# Patient Record
Sex: FEMALE | Race: WHITE | Employment: UNEMPLOYED | URBAN - METROPOLITAN AREA
[De-identification: names, ages, dates, MRNs, and addresses within clinical notes are randomized per-mention and may not be internally consistent; named-entity substitution may affect disease eponyms.]

---

## 2017-02-05 ENCOUNTER — APPOINTMENT (OUTPATIENT)
Dept: CT IMAGING | Age: 67
End: 2017-02-05
Attending: EMERGENCY MEDICINE
Payer: MEDICARE

## 2017-02-05 ENCOUNTER — HOSPITAL ENCOUNTER (EMERGENCY)
Age: 67
Discharge: NURSING, REHAB OR LONG-TERM CARE | End: 2017-02-06
Attending: EMERGENCY MEDICINE
Payer: MEDICARE

## 2017-02-05 ENCOUNTER — APPOINTMENT (OUTPATIENT)
Dept: GENERAL RADIOLOGY | Age: 67
End: 2017-02-05
Attending: EMERGENCY MEDICINE
Payer: MEDICARE

## 2017-02-05 DIAGNOSIS — R56.9 SEIZURE (HCC): Primary | ICD-10-CM

## 2017-02-05 LAB
ALBUMIN SERPL BCP-MCNC: 4.1 G/DL (ref 3.5–5)
ALBUMIN/GLOB SERPL: 1.1 {RATIO} (ref 1.1–2.2)
ALP SERPL-CCNC: 162 U/L (ref 45–117)
ALT SERPL-CCNC: 34 U/L (ref 12–78)
ANION GAP BLD CALC-SCNC: 17 MMOL/L (ref 5–15)
APPEARANCE UR: CLEAR
AST SERPL W P-5'-P-CCNC: 36 U/L (ref 15–37)
BACTERIA URNS QL MICRO: NEGATIVE /HPF
BASOPHILS # BLD AUTO: 0 K/UL (ref 0–0.1)
BASOPHILS # BLD: 0 % (ref 0–1)
BILIRUB SERPL-MCNC: 0.5 MG/DL (ref 0.2–1)
BILIRUB UR QL: NEGATIVE
BUN SERPL-MCNC: 34 MG/DL (ref 6–20)
BUN/CREAT SERPL: 24 (ref 12–20)
CALCIUM SERPL-MCNC: 9.4 MG/DL (ref 8.5–10.1)
CHLORIDE SERPL-SCNC: 102 MMOL/L (ref 97–108)
CK MB CFR SERPL CALC: 1.2 % (ref 0–2.5)
CK MB SERPL-MCNC: 11 NG/ML (ref 5–25)
CK SERPL-CCNC: 908 U/L (ref 26–192)
CO2 SERPL-SCNC: 17 MMOL/L (ref 21–32)
COLOR UR: ABNORMAL
CREAT SERPL-MCNC: 1.41 MG/DL (ref 0.55–1.02)
EOSINOPHIL # BLD: 0 K/UL (ref 0–0.4)
EOSINOPHIL NFR BLD: 0 % (ref 0–7)
EPITH CASTS URNS QL MICRO: ABNORMAL /LPF
ERYTHROCYTE [DISTWIDTH] IN BLOOD BY AUTOMATED COUNT: 13.5 % (ref 11.5–14.5)
GLOBULIN SER CALC-MCNC: 3.7 G/DL (ref 2–4)
GLUCOSE SERPL-MCNC: 482 MG/DL (ref 65–100)
GLUCOSE UR STRIP.AUTO-MCNC: >1000 MG/DL
HCT VFR BLD AUTO: 32.7 % (ref 35–47)
HGB BLD-MCNC: 11.4 G/DL (ref 11.5–16)
HGB UR QL STRIP: ABNORMAL
HYALINE CASTS URNS QL MICRO: ABNORMAL /LPF (ref 0–5)
KETONES UR QL STRIP.AUTO: 15 MG/DL
LACTATE SERPL-SCNC: 4.8 MMOL/L (ref 0.4–2)
LEUKOCYTE ESTERASE UR QL STRIP.AUTO: NEGATIVE
LYMPHOCYTES # BLD AUTO: 8 % (ref 12–49)
LYMPHOCYTES # BLD: 1.2 K/UL (ref 0.8–3.5)
MAGNESIUM SERPL-MCNC: 2.5 MG/DL (ref 1.6–2.4)
MCH RBC QN AUTO: 29.8 PG (ref 26–34)
MCHC RBC AUTO-ENTMCNC: 34.9 G/DL (ref 30–36.5)
MCV RBC AUTO: 85.4 FL (ref 80–99)
MONOCYTES # BLD: 1.2 K/UL (ref 0–1)
MONOCYTES NFR BLD AUTO: 8 % (ref 5–13)
NEUTS SEG # BLD: 13.5 K/UL (ref 1.8–8)
NEUTS SEG NFR BLD AUTO: 84 % (ref 32–75)
NITRITE UR QL STRIP.AUTO: NEGATIVE
PH UR STRIP: 6.5 [PH] (ref 5–8)
PLATELET # BLD AUTO: 407 K/UL (ref 150–400)
POTASSIUM SERPL-SCNC: 4 MMOL/L (ref 3.5–5.1)
PROT SERPL-MCNC: 7.8 G/DL (ref 6.4–8.2)
PROT UR STRIP-MCNC: NEGATIVE MG/DL
RBC # BLD AUTO: 3.83 M/UL (ref 3.8–5.2)
RBC #/AREA URNS HPF: ABNORMAL /HPF (ref 0–5)
SODIUM SERPL-SCNC: 136 MMOL/L (ref 136–145)
SP GR UR REFRACTOMETRY: 1.02 (ref 1–1.03)
TROPONIN I SERPL-MCNC: 0.04 NG/ML
UROBILINOGEN UR QL STRIP.AUTO: 0.2 EU/DL (ref 0.2–1)
WBC # BLD AUTO: 16 K/UL (ref 3.6–11)
WBC URNS QL MICRO: ABNORMAL /HPF (ref 0–4)

## 2017-02-05 PROCEDURE — 70450 CT HEAD/BRAIN W/O DYE: CPT

## 2017-02-05 PROCEDURE — 96374 THER/PROPH/DIAG INJ IV PUSH: CPT

## 2017-02-05 PROCEDURE — 72125 CT NECK SPINE W/O DYE: CPT

## 2017-02-05 PROCEDURE — 87186 SC STD MICRODIL/AGAR DIL: CPT | Performed by: EMERGENCY MEDICINE

## 2017-02-05 PROCEDURE — 83605 ASSAY OF LACTIC ACID: CPT | Performed by: EMERGENCY MEDICINE

## 2017-02-05 PROCEDURE — 83735 ASSAY OF MAGNESIUM: CPT | Performed by: EMERGENCY MEDICINE

## 2017-02-05 PROCEDURE — 36415 COLL VENOUS BLD VENIPUNCTURE: CPT | Performed by: EMERGENCY MEDICINE

## 2017-02-05 PROCEDURE — 82550 ASSAY OF CK (CPK): CPT | Performed by: EMERGENCY MEDICINE

## 2017-02-05 PROCEDURE — 81001 URINALYSIS AUTO W/SCOPE: CPT | Performed by: EMERGENCY MEDICINE

## 2017-02-05 PROCEDURE — 74011250636 HC RX REV CODE- 250/636: Performed by: EMERGENCY MEDICINE

## 2017-02-05 PROCEDURE — 87040 BLOOD CULTURE FOR BACTERIA: CPT | Performed by: EMERGENCY MEDICINE

## 2017-02-05 PROCEDURE — 80053 COMPREHEN METABOLIC PANEL: CPT | Performed by: EMERGENCY MEDICINE

## 2017-02-05 PROCEDURE — 96375 TX/PRO/DX INJ NEW DRUG ADDON: CPT

## 2017-02-05 PROCEDURE — 93005 ELECTROCARDIOGRAM TRACING: CPT

## 2017-02-05 PROCEDURE — 99285 EMERGENCY DEPT VISIT HI MDM: CPT

## 2017-02-05 PROCEDURE — 71010 XR CHEST PORT: CPT

## 2017-02-05 PROCEDURE — 84484 ASSAY OF TROPONIN QUANT: CPT | Performed by: EMERGENCY MEDICINE

## 2017-02-05 PROCEDURE — 87077 CULTURE AEROBIC IDENTIFY: CPT | Performed by: EMERGENCY MEDICINE

## 2017-02-05 PROCEDURE — 85025 COMPLETE CBC W/AUTO DIFF WBC: CPT | Performed by: EMERGENCY MEDICINE

## 2017-02-05 PROCEDURE — 96361 HYDRATE IV INFUSION ADD-ON: CPT

## 2017-02-05 RX ORDER — LORAZEPAM 2 MG/ML
1 INJECTION INTRAMUSCULAR
Status: COMPLETED | OUTPATIENT
Start: 2017-02-05 | End: 2017-02-06

## 2017-02-05 RX ORDER — LABETALOL HCL 20 MG/4 ML
10 SYRINGE (ML) INTRAVENOUS
Status: DISCONTINUED | OUTPATIENT
Start: 2017-02-05 | End: 2017-02-06 | Stop reason: HOSPADM

## 2017-02-05 RX ORDER — SODIUM CHLORIDE 0.9 % (FLUSH) 0.9 %
5-10 SYRINGE (ML) INJECTION AS NEEDED
Status: DISCONTINUED | OUTPATIENT
Start: 2017-02-05 | End: 2017-02-06 | Stop reason: HOSPADM

## 2017-02-05 RX ADMIN — SODIUM CHLORIDE 1000 ML: 900 INJECTION, SOLUTION INTRAVENOUS at 22:48

## 2017-02-06 VITALS
TEMPERATURE: 98.1 F | OXYGEN SATURATION: 99 % | RESPIRATION RATE: 16 BRPM | SYSTOLIC BLOOD PRESSURE: 144 MMHG | HEART RATE: 92 BPM | DIASTOLIC BLOOD PRESSURE: 65 MMHG

## 2017-02-06 LAB
ATRIAL RATE: 122 BPM
CALCULATED R AXIS, ECG10: 87 DEGREES
CALCULATED T AXIS, ECG11: 61 DEGREES
CK SERPL-CCNC: 889 U/L (ref 26–192)
DIAGNOSIS, 93000: NORMAL
GLUCOSE BLD STRIP.AUTO-MCNC: 250 MG/DL (ref 65–100)
LACTATE SERPL-SCNC: 2.8 MMOL/L (ref 0.4–2)
LACTATE SERPL-SCNC: 3.8 MMOL/L (ref 0.4–2)
P-R INTERVAL, ECG05: 142 MS
Q-T INTERVAL, ECG07: 338 MS
QRS DURATION, ECG06: 92 MS
QTC CALCULATION (BEZET), ECG08: 481 MS
SERVICE CMNT-IMP: ABNORMAL
VENTRICULAR RATE, ECG03: 122 BPM

## 2017-02-06 PROCEDURE — A9270 NON-COVERED ITEM OR SERVICE: HCPCS | Performed by: EMERGENCY MEDICINE

## 2017-02-06 PROCEDURE — 74011636637 HC RX REV CODE- 636/637: Performed by: EMERGENCY MEDICINE

## 2017-02-06 PROCEDURE — 83605 ASSAY OF LACTIC ACID: CPT | Performed by: EMERGENCY MEDICINE

## 2017-02-06 PROCEDURE — 82550 ASSAY OF CK (CPK): CPT | Performed by: EMERGENCY MEDICINE

## 2017-02-06 PROCEDURE — 82962 GLUCOSE BLOOD TEST: CPT

## 2017-02-06 PROCEDURE — 74011250636 HC RX REV CODE- 250/636: Performed by: EMERGENCY MEDICINE

## 2017-02-06 RX ORDER — RIVASTIGMINE TARTRATE 1.5 MG/1
1.5 CAPSULE ORAL 2 TIMES DAILY WITH MEALS
COMMUNITY

## 2017-02-06 RX ORDER — METFORMIN HYDROCHLORIDE 1000 MG/1
1000 TABLET ORAL 2 TIMES DAILY WITH MEALS
COMMUNITY

## 2017-02-06 RX ORDER — LEVOTHYROXINE SODIUM 100 UG/1
100 TABLET ORAL
COMMUNITY

## 2017-02-06 RX ORDER — METOPROLOL SUCCINATE 50 MG/1
50 TABLET, EXTENDED RELEASE ORAL 2 TIMES DAILY
COMMUNITY

## 2017-02-06 RX ORDER — INSULIN ASPART 100 [IU]/ML
100 INJECTION, SOLUTION INTRAVENOUS; SUBCUTANEOUS
COMMUNITY

## 2017-02-06 RX ADMIN — INSULIN HUMAN 10 UNITS: 100 INJECTION, SOLUTION PARENTERAL at 00:03

## 2017-02-06 RX ADMIN — SODIUM CHLORIDE 800 ML: 900 INJECTION, SOLUTION INTRAVENOUS at 00:33

## 2017-02-06 RX ADMIN — LORAZEPAM 1 MG: 2 INJECTION INTRAMUSCULAR; INTRAVENOUS at 00:34

## 2017-02-06 NOTE — ED NOTES
____Christine____________________ in to talk with patient and explain plan of care with  understanding and  written & verbal instructions.

## 2017-02-06 NOTE — DISCHARGE INSTRUCTIONS
- You may have had a seizure. You need to follow up with your primary care provider and a neurologist as soon as possible. Please return to ED if you have another seizure or develop a fever or any other symptoms you are concerned about. Seizure: Care Instructions  Your Care Instructions    Seizures are caused by abnormal patterns of electrical signals in the brain. They are different for each person. Seizures can affect movement, speech, vision, or awareness. Some people have only slight shaking of a hand and do not pass out. Other people may pass out and have violent shaking of the whole body. Some people appear to stare into space. They are awake, but they can't respond normally. Later, they may not remember what happened. You may need tests to identify the type and cause of the seizures. A seizure may occur only once, or you may have them more than one time. Taking medicines as directed and following up with your doctor may help keep you from having more seizures. The doctor has checked you carefully, but problems can develop later. If you notice any problems or new symptoms, get medical treatment right away. Follow-up care is a key part of your treatment and safety. Be sure to make and go to all appointments, and call your doctor if you are having problems. It's also a good idea to know your test results and keep a list of the medicines you take. How can you care for yourself at home? · Be safe with medicines. Take your medicines exactly as prescribed. Call your doctor if you think you are having a problem with your medicine. · Do not do any activity that could be dangerous to you or others until your doctor says it is safe to do so. For example, do not drive a car, operate machinery, swim, or climb ladders. · Be sure that anyone treating you for any health problem knows that you have had a seizure and what medicines you are taking for it.   · Identify and avoid things that may make you more likely to have a seizure. These may include lack of sleep, alcohol or drug use, stress, or not eating. · Make sure you go to your follow-up appointment. When should you call for help? Call 911 anytime you think you may need emergency care. For example, call if:  · You have another seizure. · You have more than one seizure in 24 hours. · You have new symptoms, such as trouble walking, speaking, or thinking clearly. Call your doctor now or seek immediate medical care if:  · You are not acting normally. Watch closely for changes in your health, and be sure to contact your doctor if you have any problems. Where can you learn more? Go to http://ondina-hugo.info/. Enter O867 in the search box to learn more about \"Seizure: Care Instructions. \"  Current as of: February 19, 2016  Content Version: 11.1  © 4417-2237 [employer]. Care instructions adapted under license by [employer] (which disclaims liability or warranty for this information). If you have questions about a medical condition or this instruction, always ask your healthcare professional. [address] any warranty or liability for your use of this information.

## 2017-02-06 NOTE — ED NOTES
Patient assisted to bedside commode with 2 standby to help & patient had already voided in diaper with diaper changed & fresh linens applied to patient & bed & patient assisted back to bed with both side rails up & call bell in reach with bed in lowest position

## 2017-02-06 NOTE — ED PROVIDER NOTES
HPI Comments: Edyta Barragan is a 73 y.o. female, pmhx HTN / DM / Alzheimer's Dementia, who presents via EMS as transferred from [facility] to the ED for evaluation of seizure like activity PTA this evening. Per SNF report, pt has not been taking any of her daily medications prior to her seizure this evening. Hx otherwise limited. On evaluation, pt continuously states \"I'm sick\". PCP: No primary care provider on file. PMHx: Significant for HTN, DM, Alzheimer's Dementia, schizophrenia, asthma    Hx limited secondary to pt's known hx of dementia. The history is provided by the patient and the EMS personnel. History reviewed. No pertinent past medical history. History reviewed. No pertinent past surgical history. No family history on file. Social History     Social History    Marital status: SINGLE     Spouse name: N/A    Number of children: N/A    Years of education: N/A     Occupational History    Not on file. Social History Main Topics    Smoking status: Not on file    Smokeless tobacco: Not on file    Alcohol use Not on file    Drug use: Not on file    Sexual activity: Not on file     Other Topics Concern    Not on file     Social History Narrative    No narrative on file         ALLERGIES: Review of patient's allergies indicates no known allergies. Review of Systems   Unable to perform ROS: Dementia   Hx of schizhophrenia    Vitals:    02/06/17 0215 02/06/17 0230 02/06/17 0330 02/06/17 0345   BP: 141/43 131/43 127/51 131/57   Pulse: 96 96 99 92   Resp: 15 16     Temp:       SpO2: 98% 98% 96% 99%            Physical Exam   Constitutional: She is oriented to person, place, and time. She appears well-developed and well-nourished. No distress. HENT:   Head: Normocephalic and atraumatic. Eyes: EOM are normal. Pupils are equal, round, and reactive to light. Right eye exhibits no discharge. Left eye exhibits no discharge. No scleral icterus.    Neck: Normal range of motion. Neck supple. No tracheal deviation present. Cardiovascular: Regular rhythm, normal heart sounds and intact distal pulses. Tachycardia present. Exam reveals no gallop and no friction rub. No murmur heard. Pulmonary/Chest: Effort normal and breath sounds normal. No respiratory distress. She has no wheezes. She has no rales. Abdominal: Soft. She exhibits no distension. There is no tenderness. Musculoskeletal: Normal range of motion. She exhibits no edema. Lymphadenopathy:     She has no cervical adenopathy. Neurological: She is alert and oriented to person, place, and time. No focal neuro deficits  Pt intermittently answering questions appropriately, but slow to respond. Facial symmetry. Moving all extremities spontaneously and equally. Skin: Skin is warm and dry. No rash noted. Psychiatric: She has a normal mood and affect. Nursing note and vitals reviewed. Written by Dinora Zelaya ED Scribe, as dictated by Junior Ruelas MD    MDM  Number of Diagnoses or Management Options  Seizure [facility]):   Diagnosis management comments:     Patient presents to ED after seizure like activity. She has history of one similar episode three years ago but is not currently on AEDs. She is oriented, afebrile on exam and neurologically intact.   Differential includes seizure, head injury, neck injury, ICH, electrolyte abnormality, dehydration, ACS, syncope, arrhythmia  - CBC, CMP, Sri, lactate, UA  - HCT, CT c-spine  - CXR  - Telemetry monitoring, monitor for additional seizure activity         Amount and/or Complexity of Data Reviewed  Clinical lab tests: reviewed and ordered  Tests in the radiology section of CPT®: reviewed and ordered  Tests in the medicine section of CPT®: ordered and reviewed  Obtain history from someone other than the patient: yes (EMS)  Review and summarize past medical records: yes  Independent visualization of images, tracings, or specimens: yes        Procedures    Pulse Oximetry Analysis - Normal 100% on RA    Cardiac Monitor:   Rate: 122 bpm   Rhythm: Sinus Tachycardia     PROGRESS NOTE:  10:00 PM  Physician took pt's rectal temperature at bedside. Temperature 98.5 F. Written by Dimas Essex, ED Scribe, as dictated by Orlin Escobar MD    EKG interpretation: (Preliminary)2229  Rhythm: sinus tachycardia; and regular . Rate (approx.): 122bpm; Axis: normal; [place] interval: normal; QRS interval: normal ; ST/T wave: normal.  Written by Dimas Essex, ED Scribe, as dictated by Orlin Escobar MD    PROGRESS NOTE:  10:30 PM  Spoke with nurse from SNF. Nurse states the pt spent her day walking the halls of the nursing home and refusing all her daily medications. Nurse reports the pt subsequently fell prior to witnessed seizure like activity. Per SNF nurse, family states the pt has had a similar episode ~3 years ago, but has not been dx'd with a seizure disorder. Written by Dimas Essex, ED Scribe, as dictated by Orlin Escobar MD    PROGRESS NOTE:  12:34 AM  Spoke with nurse from SNF again. Nurse states the pt is confused at baseline and is unable to answer questions appropriately at baseline. Written by Dimas Essex, ED Scribe, as dictated by Orlin Escobar MD    PROGRESS NOTE:  3:55 AM  Pt reevaluated. Pt currently alert, oriented, afebrile and neurologically intact. She is at baseline mental status according to nursing report from SNF. She is ambulating without assistance. WBC 16 and lactate elevated but likely due to seizure. Lactate trending down. Do not suspect meningitis/encephalitis at this time. Plan to discharge pt back to SNF with instructions to follow up with PCP and neurology. Discussed all final lab and imaging findings with staff at [facility]. Advised SNF nursing staff that pt should return to the ED if she should develop a fever or have additional seizure like activity.  No further episodes of seizure like activity while in ED. Written by ALLIE Torres, as dictated by Lisandro Olguin MD    LABORATORY TESTS:  Recent Results (from the past 12 hour(s))   LACTIC ACID, PLASMA    Collection Time: 02/05/17 10:18 PM   Result Value Ref Range    Lactic acid 4.8 (HH) 0.4 - 2.0 MMOL/L   METABOLIC PANEL, COMPREHENSIVE    Collection Time: 02/05/17 10:18 PM   Result Value Ref Range    Sodium 136 136 - 145 mmol/L    Potassium 4.0 3.5 - 5.1 mmol/L    Chloride 102 97 - 108 mmol/L    CO2 17 (L) 21 - 32 mmol/L    Anion gap 17 (H) 5 - 15 mmol/L    Glucose 482 (H) 65 - 100 mg/dL    BUN 34 (H) 6 - 20 MG/DL    Creatinine 1.41 (H) 0.55 - 1.02 MG/DL    BUN/Creatinine ratio 24 (H) 12 - 20      GFR est AA 45 (L) >60 ml/min/1.73m2    GFR est non-AA 37 (L) >60 ml/min/1.73m2    Calcium 9.4 8.5 - 10.1 MG/DL    Bilirubin, total 0.5 0.2 - 1.0 MG/DL    ALT (SGPT) 34 12 - 78 U/L    AST (SGOT) 36 15 - 37 U/L    Alk. phosphatase 162 (H) 45 - 117 U/L    Protein, total 7.8 6.4 - 8.2 g/dL    Albumin 4.1 3.5 - 5.0 g/dL    Globulin 3.7 2.0 - 4.0 g/dL    A-G Ratio 1.1 1.1 - 2.2     CBC WITH AUTOMATED DIFF    Collection Time: 02/05/17 10:18 PM   Result Value Ref Range    WBC 16.0 (H) 3.6 - 11.0 K/uL    RBC 3.83 3.80 - 5.20 M/uL    HGB 11.4 (L) 11.5 - 16.0 g/dL    HCT 32.7 (L) 35.0 - 47.0 %    MCV 85.4 80.0 - 99.0 FL    MCH 29.8 26.0 - 34.0 PG    MCHC 34.9 30.0 - 36.5 g/dL    RDW 13.5 11.5 - 14.5 %    PLATELET 304 (H) 491 - 400 K/uL    NEUTROPHILS 84 (H) 32 - 75 %    LYMPHOCYTES 8 (L) 12 - 49 %    MONOCYTES 8 5 - 13 %    EOSINOPHILS 0 0 - 7 %    BASOPHILS 0 0 - 1 %    ABS. NEUTROPHILS 13.5 (H) 1.8 - 8.0 K/UL    ABS. LYMPHOCYTES 1.2 0.8 - 3.5 K/UL    ABS. MONOCYTES 1.2 (H) 0.0 - 1.0 K/UL    ABS. EOSINOPHILS 0.0 0.0 - 0.4 K/UL    ABS.  BASOPHILS 0.0 0.0 - 0.1 K/UL   CK W/ CKMB & INDEX    Collection Time: 02/05/17 10:18 PM   Result Value Ref Range     (H) 26 - 192 U/L    CK - MB 11.0 (H) <3.6 NG/ML    CK-MB Index 1.2 0 - 2.5     TROPONIN I    Collection Time: 02/05/17 10:18 PM   Result Value Ref Range    Troponin-I, Qt. 0.04 <0.05 ng/mL   MAGNESIUM    Collection Time: 02/05/17 10:18 PM   Result Value Ref Range    Magnesium 2.5 (H) 1.6 - 2.4 mg/dL   EKG, 12 LEAD, INITIAL    Collection Time: 02/05/17 10:29 PM   Result Value Ref Range    Ventricular Rate 122 BPM    Atrial Rate 122 BPM    P-R Interval 142 ms    QRS Duration 92 ms    Q-T Interval 338 ms    QTC Calculation (Bezet) 481 ms    Calculated R Axis 87 degrees    Calculated T Axis 61 degrees    Diagnosis       Sinus tachycardia  Junctional ST depression, probably normal  No previous ECGs available     URINALYSIS W/ RFLX MICROSCOPIC    Collection Time: 02/05/17 11:16 PM   Result Value Ref Range    Color YELLOW/STRAW      Appearance CLEAR CLEAR      Specific gravity 1.017 1.003 - 1.030      pH (UA) 6.5 5.0 - 8.0      Protein NEGATIVE  NEG mg/dL    Glucose >1000 (A) NEG mg/dL    Ketone 15 (A) NEG mg/dL    Bilirubin NEGATIVE  NEG      Blood TRACE (A) NEG      Urobilinogen 0.2 0.2 - 1.0 EU/dL    Nitrites NEGATIVE  NEG      Leukocyte Esterase NEGATIVE  NEG      WBC 0-4 0 - 4 /hpf    RBC 0-5 0 - 5 /hpf    Epithelial cells FEW FEW /lpf    Bacteria NEGATIVE  NEG /hpf    Hyaline cast 0-2 0 - 5 /lpf   GLUCOSE, POC    Collection Time: 02/06/17 12:38 AM   Result Value Ref Range    Glucose (POC) 250 (H) 65 - 100 mg/dL    Performed by [address] [place] [address] Road ACID, PLASMA    Collection Time: 02/06/17 12:40 AM   Result Value Ref Range    Lactic acid 3.8 (HH) 0.4 - 2.0 MMOL/L   CK    Collection Time: 02/06/17 12:40 AM   Result Value Ref Range     (H) 26 - 192 U/L   LACTIC ACID, PLASMA    Collection Time: 02/06/17  2:50 AM   Result Value Ref Range    Lactic acid 2.8 (HH) 0.4 - 2.0 MMOL/L       IMAGING RESULTS:  XR CHEST PORT   Final Result     EXAM: XR CHEST PORT     INDICATION: Sepsis.  Seizure-like activity prior to arrival.     COMPARISON: None.     FINDINGS: A portable AP radiograph of the chest was obtained at 22:04 hours. The  patient is on a cardiac monitor. The lungs are clear apart from bilateral  calcified granulomata. No pleural effusion or pneumothorax. The cardiac and  mediastinal contours and pulmonary vascularity are normal. The splenic  vasculature structures show diffuse osteopenia, degenerative spine change, and  ORIF plate and screws transversing a healed appearing left humeral neck  fracture. The visualized upper abdomen appears grossly unremarkable. .      IMPRESSION  IMPRESSION: No acute findings. CT HEAD WO CONT     EXAM: CT HEAD WO CONT     INDICATION: AMS, seizure. Seizure like activity PTA this evening. Per SNF  report, pt has not been taking any of her daily medications prior to her seizure  this evening. Hx otherwise limited. On evaluation, pt continuously states \"I'm  sick\".      COMPARISON: None.     TECHNIQUE: Unenhanced CT of the head was performed using 5 mm images. Brain and  bone windows were generated. CT dose reduction was achieved through use of a  standardized protocol tailored for this examination and automatic exposure  control for dose modulation.      FINDINGS:  The ventricles and sulci are normal in size, shape and configuration and  midline. There is no significant white matter disease. There is no intracranial  hemorrhage, extra-axial collection, mass, mass effect or midline shift. The  basilar cisterns are open. No acute infarct is identified. The bone windows  demonstrate no abnormalities. The visualized portions of the paranasal sinuses  and mastoid air cells are clear.     IMPRESSION  IMPRESSION: No acute findings. CT SPINE CERV WO CONT     EXAM: CT CERVICAL SPINE WITHOUT CONTRAST     INDICATION: fall with head injury, AMS. Seizure like activity PTA this  evening. Per SNF report, pt has not been taking any of her daily medications  prior to her seizure this evening. Hx otherwise limited.  On evaluation, pt  continuously states \"I'm sick\".      COMPARISON: None.     TECHNIQUE: Multislice helical CT of the cervical spine was performed without  intravenous contrast administration. Sagittal and coronal reconstructions were  generated. CT dose reduction was achieved through use of a standardized  protocol tailored for this examination and automatic exposure control for dose  modulation.      CONTRAST: None.     FINDINGS:     The examination is compromised by motion artifact at the C2 level. The alignment  is within normal limits. There is no fracture or subluxation. The odontoid  process is intact. The craniocervical junction is within normal limits. The  prevertebral soft tissues are within normal limits. There is no spinal canal or  neural foraminal stenosis. The surrounding soft tissues and visualized lung  apices appear unremarkable. Degenerative spine changes are noted most severely  at the C5-6 level.     IMPRESSION  IMPRESSION: No acute findings on examination compromised by motion artifact at  the C2 level. MEDICATIONS GIVEN:  Medications   sodium chloride (NS) flush 5-10 mL (not administered)   labetalol (NORMODYNE;TRANDATE) 20 mg/4 mL (5 mg/mL) injection 10 mg (not administered)   sodium chloride 0.9 % bolus infusion 1,000 mL (0 mL IntraVENous IV Completed 2/6/17 0030)   LORazepam (ATIVAN) injection 1 mg (1 mg IntraVENous Given 2/6/17 0034)   insulin regular (NOVOLIN R, HUMULIN R) injection 10 Units (10 Units IntraVENous Given 2/6/17 0003)   sodium chloride 0.9 % bolus infusion 800 mL (0 mL IntraVENous IV Completed 2/6/17 0252)       IMPRESSION:  1. Seizure ([address]        PLAN:  1. There are no discharge medications for this patient.     2.   Follow-up Information     Follow up With Details Comments Contact Info    None  Please follow up with primary care provider as soon as possible None [phone] Patient stated that they have no PCP      Mark Abdullahi MD  Please follow up with neurology as soon as possible [address]  MOB [address] [facility]  [phone]      hospitals EMERGENCY DEPT  As needed, If symptoms worsen [address] [place] [address]  [phone]        Return to ED if worse     DISCHARGE NOTE:  4:02 AM  The patient's results have been reviewed with family and/or caregiver. They verbally convey their understanding and agreement of the patient's signs, symptoms, diagnosis, treatment, and prognosis. They additionally agree to follow up as recommended in the discharge instructions or to return to the Emergency Room should the patient's condition change prior to their follow-up appointment. The family and/or caregiver verbally agrees with the care-plan and all of their questions have been answered. The discharge instructions have also been provided to the them along with educational information regarding the patient's diagnosis and a list of reasons why the patient would want to return to the ER prior to their follow-up appointment should their condition change. Written by Lashell Daniel, ED Scribe, as dictated by Wayne Medina MD.         This note is prepared by Lashell Daniel, acting as Scribe for MD Wayne Cedillo MD : The scribe's documentation has been prepared under my direction and personally reviewed by me in its entirety. I confirm that the note above accurately reflects all work, treatment, procedures, and medical decision making performed by me. This note will not be viewable in [address]

## 2017-02-08 LAB
BACTERIA SPEC CULT: ABNORMAL
BACTERIA SPEC CULT: ABNORMAL
SERVICE CMNT-IMP: ABNORMAL

## 2017-02-08 NOTE — PROGRESS NOTES
[place] [facility] [address]. Called and spoke to patient's nurse, Parth Ortega. Will fax result to facility and they will order appropriate medications.

## 2017-02-11 LAB
BACTERIA SPEC CULT: NORMAL
SERVICE CMNT-IMP: NORMAL